# Patient Record
Sex: FEMALE | Employment: OTHER | URBAN - METROPOLITAN AREA
[De-identification: names, ages, dates, MRNs, and addresses within clinical notes are randomized per-mention and may not be internally consistent; named-entity substitution may affect disease eponyms.]

---

## 2018-01-10 ENCOUNTER — CARE COORDINATION (OUTPATIENT)
Dept: GASTROENTEROLOGY | Facility: CLINIC | Age: 63
End: 2018-01-10

## 2018-01-11 NOTE — PROGRESS NOTES
Pt states she is doing okay and can wait until March to be seen.  Move dup to 140 pm on March 5. Has my number to call if needs help with refill on asacol.

## 2018-02-14 NOTE — TELEPHONE ENCOUNTER
Phone Call:    Who did you talk to? (or) Who did you call? Pt returned my call   Call Detail/Action: Pt states all recs at [facility]

## 2018-02-28 ENCOUNTER — TELEPHONE (OUTPATIENT)
Dept: GASTROENTEROLOGY | Facility: CLINIC | Age: 63
End: 2018-02-28

## 2018-03-05 ENCOUNTER — OFFICE VISIT (OUTPATIENT)
Dept: GASTROENTEROLOGY | Facility: CLINIC | Age: 63
End: 2018-03-05
Payer: COMMERCIAL

## 2018-03-05 ENCOUNTER — PRE VISIT (OUTPATIENT)
Dept: GASTROENTEROLOGY | Facility: CLINIC | Age: 63
End: 2018-03-05

## 2018-03-05 VITALS
DIASTOLIC BLOOD PRESSURE: 68 MMHG | HEIGHT: 64 IN | OXYGEN SATURATION: 97 % | BODY MASS INDEX: 22.24 KG/M2 | WEIGHT: 130.3 LBS | TEMPERATURE: 98.4 F | HEART RATE: 71 BPM | SYSTOLIC BLOOD PRESSURE: 115 MMHG

## 2018-03-05 DIAGNOSIS — K52.832 LYMPHOCYTIC COLITIS: Primary | ICD-10-CM

## 2018-03-05 RX ORDER — HYDROCORTISONE 2.5 %
CREAM (GRAM) TOPICAL
COMMUNITY
Start: 2017-06-30

## 2018-03-05 RX ORDER — METOPROLOL SUCCINATE 25 MG/1
25 TABLET, EXTENDED RELEASE ORAL
COMMUNITY
Start: 2017-10-17 | End: 2018-06-13

## 2018-03-05 RX ORDER — ASPIRIN 81 MG/1
81 TABLET, CHEWABLE ORAL
COMMUNITY

## 2018-03-05 RX ORDER — CIPROFLOXACIN AND DEXAMETHASONE 3; 1 MG/ML; MG/ML
4 SUSPENSION/ DROPS AURICULAR (OTIC)
COMMUNITY
Start: 2018-03-05 | End: 2018-03-15

## 2018-03-05 RX ORDER — LOPERAMIDE HCL 2 MG
2 CAPSULE ORAL 4 TIMES DAILY PRN
COMMUNITY

## 2018-03-05 RX ORDER — DICYCLOMINE HYDROCHLORIDE 10 MG/1
CAPSULE ORAL
Qty: 240 CAPSULE | Refills: 3 | Status: SHIPPED | OUTPATIENT
Start: 2018-03-05

## 2018-03-05 RX ORDER — IBUPROFEN 200 MG
600 TABLET ORAL
COMMUNITY

## 2018-03-05 RX ORDER — VALACYCLOVIR HYDROCHLORIDE 500 MG/1
TABLET, FILM COATED ORAL
COMMUNITY
Start: 2017-11-20

## 2018-03-05 RX ORDER — MULTIVITAMIN
TABLET ORAL
COMMUNITY
Start: 2005-01-04

## 2018-03-05 ASSESSMENT — PAIN SCALES - GENERAL: PAINLEVEL: NO PAIN (0)

## 2018-03-05 NOTE — LETTER
3/5/2018       RE: Augustina Milligan  [address] [id number]     Dear Colleague,    Thank you for referring your patient, Augustina Milligan, to the [facility] GASTRO[facility]OLOGY AND IBD CLINIC at [facility]. Please see a copy of my visit note below.    IBD CLINIC VISIT     CC/REFERRING MD:  Referred Self  REASON FOR CONSULTATION: Ulcerative colitis    IBD HISTORY  Age at diagnosis: 2002  Extent of disease:   Current UC medications: None  Prior UC surgeries:  Prior IBD Medications:  Prednisone  Asacol    DRUG MONITORING  TPMT enzyme activity:     6-TGN/6-MMPN levels:    Biologic concentration:    DISEASE ASSESSMENT  Labs:  No lab results found.    Invalid input(s):  ALB,  HGB  Endoscopic assessment: Normal -   Enterography: --  Fecal calprotectin: --  C diff: --  IBD-7 serology: not consistent with IBD (2008)    ASSESSMENT/PLAN  62-year-old female with a history of ulcerative colitis who is here today to establish care with me    1.  Ulcerative colitis: Do not see any evidence that she definitively has chronic inflammation in her colon.  It sounds like there was one colonoscopy that showed colitis in the past.  However it was not clear that this was chronic colitis.  She was placed on prednisone and Asacol for this.  On review of her last 2 colonoscopies she had no endoscopic evidence of disease.  I can review the pathology from November 2017 which did not demonstrate any diagnostic abnormalities from the right with the left colon.  Options here that she has either been down her disease and is going for spontaneous deep remission or she has never had ulcerative colitis was misdiagnosed with infectious colitis.  Otherwise she is currently not on any medications and doing well with many symptoms of irritable bowel syndrome.  If she were to have new symptoms in the future it is reasonable to restart her Asacol.  However prior to any prednisone we would need to obtain a  flexible sigmoidoscopy or full colonoscopy to determine if she actually has any intestinal inflammation.  -- No specific IBD therapy at this time  -- Try to obtain records from initial diagnosis to determine if she has ulcerative colitis   -- Yearly CBC and LFTs for presumptive diagnosis of UC    2) Irritable bowel syndrome: Current symptoms are currently strongly suggestive of irritable bowel syndrome.  She has a mixed pattern with bursts of rapid transit and he had some hard stools suggesting slow transit.  I think fiber is essential for her stool pattern to normalize.  Unfortunately she has had bad reactions to Metamucil the past.  I counseled her to start a very low-dose of a soluble fiber such as Benefiber and slowly titrate up to 1 A day.  It was spasmodic such as Bentyl may also be helpful particularly given her pain is at night which I believe to be colonic spasms.  -- Soluble fiber: slow titration up to 1 cap per day  -- Bentyl at night  -- Follow-up in GI office in 4 months to assess response to fiber.     IBD Health Care Maintenance:    Vaccinations:  All patients on biologics should avoid live vaccines.    -- Influenza (every year)  -- TdaP (every 10 years)  -- Pneumococcal Pneumonia     One time confirmation of immunity or serologies:  -- Hepatitis A (serologies or immunizations)  -- Hepatitis B (serologies or immunizations)  -- Varicella  -- MMR  -- HPV (all aged 18-26)  -- Meningococcal meningitis (all patients at risk for meningitis)    Bone mineral density screening   -- Recommend all patients supplement with calcium and vitamin D  -- Known osteopenia - patient to follow with PCP.     Cancer Screening:  Colon cancer screening:  Unclear if she has UC or not.  She had been on a 1-2 year interval.  Will tentatively plan on repeat colonoscopy in 2020 (3 years from last).  If that is normal, will strongly consider 5-10 year interval.      Cervical cancer screening: Per OBGYN    Skin cancer screening:  Annual visual exam of skin by dermatologist since patient is immunocompromised    Depression Screening:  -- Over the last month, have you felt down, depressed, or hopeless? --  -- Over the last month, have you felt little interest or pleasure doing things? --    Misc:  -- Avoid tobacco use  -- Avoid NSAIDs as there is potentially a 25% chance of causing an IBD flare    Return to clinic in 4 months    Thank you for this consultation.  It was a pleasure to participate in the care of this patient; please contact us with any further questions.     Trevor Plaza MD   of Medicine  Inflammatory Bowel Disease Program   Division of Gastroenterology, Hepatology and Nutrition  [facility]          HPI:   She reports initially being diagnosed with ulcerative colitis in 2002.  She was treated with prednisone and Asacol for many years.The diagnosis is somewhat uncertain.  In fact in 2008 there was a question of whether or not she had ulcerative colitis.  Dr. Dannielle Wild sent in IBD panel which was negative for biomarkers for IBD.  And on review of available records it sounds like she had an episode of acute colitis which in retrospect may have been infectious.  However she was continued to be called ulcerative colitis and continue to take intermittent Asacol for episodes of diarrhea.  She has also been diagnosed with irritable bowel syndrome and notes that a lot of her GI symptoms are related to her diet    Currently she is having episodes of watery stools to hard stools alternating often the same day.  She will typically up to 3 stools a day.  They are not liquid but then can transition in a hard pebble-like stools she does think recently they have been somewhat more formed.  Stools are worse when she travels and worsens antibiotics.  Of note she has tried fiber in the past particular Metamucil and noted that it caused pain and abdominal cramping.      ROS:    No fevers or chills  No weight loss  No  blurry vision, double vision or change in vision  No sore throat  No lymphadenopathy  No headache, paraesthesias, or weakness in a limb  No shortness of breath or wheezing  No chest pain or pressure  No arthralgias or myalgias  No rashes or skin changes  No odynophagia or dysphagia  No BRBPR, hematochezia, melena  No dysuria, frequency or urgency  No hot/cold intolerance or polyria  No anxiety or depression    Extra intestinal manifestations of IBD:  No uveitis/episcleritis  No aphthous ulcers   No arthritis   No erythema nodosum/pyoderma gangrenosum.     PERTINENT PAST MEDICAL HISTORY:  Past Medical History:   Diagnosis Date     Colitis      GERD (gastroesophageal reflux disease)      IBS (irritable bowel syndrome)      Osteopenia        PREVIOUS SURGERIES:  Past Surgical History:   Procedure Laterality Date     LASIK         PREVIOUS ENDOSCOPY:  11/2017:   Impression:            - Tortuous colon.  - Normal mucosa in the entire examined colon. Biopsied.  - The examination was otherwise normal on direct and retroflexion views.  Path:   A. Colon, right, biopsy -- No diagnostic abnormality   B. Colon, left, biopsy -- No diagnostic abnormality     6/2012:  Impression:  - Tortuous colon.  - The entire examined colon is normal. This was biopsied.  - Non-bleeding internal hemorrhoids.  - The examined portion of the ileum was normal.  - The colitis appears to be in endoscopic and   clinical remission, await bx. May consider tapering off asacol. No signs of structural abnormality to explain bout of constipation.    ALLERGIES:     Allergies   Allergen Reactions     Lorazepam Other (See Comments)     Does not work for pt.She does not want to be prescribed this medication.     Morphine Other (See Comments)     CAN'T SLEEP,HYPERACTIVE     Sulfa Drugs Swelling     Zolpidem Other (See Comments)     Does not work for pt. She does not want to be prescribed this medication.     Codeine Anxiety     Diphenhydramine Anxiety      hyperactivity       PERTINENT MEDICATIONS:    Current Outpatient Prescriptions:      aspirin 81 MG chewable tablet, Take 81 mg by mouth, Disp: , Rfl:      ciprofloxacin-dexamethasone (CIPRODEX) otic suspension, 4 drops, Disp: , Rfl:      diclofenac (VOLTAREN) 1 % GEL topical gel, 2 g, Disp: , Rfl:      estradiol (ESTRING) 2 MG vaginal ring, Replace vaginally every 90 days, Disp: , Rfl:      hydrocortisone 2.5 % cream, apply to affected area 1-2 times daily prn itch, Disp: , Rfl:      ibuprofen (ADVIL/MOTRIN) 200 MG tablet, Take 600 mg by mouth, Disp: , Rfl:      metoprolol succinate (TOPROL-XL) 25 MG 24 hr tablet, Take 25 mg by mouth, Disp: , Rfl:      Multiple vitamin TABS, , Disp: , Rfl:      valACYclovir (VALTREX) 500 MG tablet, TAKE 1 TABLET BY MOUTH TWICE DAILY FOR 3 DAYS AS NEEDED FOR COLD SORES, Disp: , Rfl:      bismuth subsalicylate (PEPTO-BISMOL MAX STRENGTH) 525 MG/15ML SUSP, Take 30 mLs by mouth, Disp: , Rfl:      Bismuth Subsalicylate (PEPTO-BISMOL MAX STRENGTH PO), Take 2 chew tab by mouth, Disp: , Rfl:      loperamide (IMODIUM) 2 MG capsule, Take 2 mg by mouth 4 times daily as needed for diarrhea, Disp: , Rfl:     SOCIAL HISTORY:  Social History     Social History     Marital status:      Spouse name: N/A     Number of children: N/A     Years of education: N/A     Occupational History     Not on file.     Social History Main Topics     Smoking status: Never Smoker     Smokeless tobacco: Never Used     Alcohol use 3.0 oz/week     5 Glasses of wine per week     Drug use: No     Sexual activity: Not on file     Other Topics Concern     Not on file     Social History Narrative     No narrative on file       FAMILY HISTORY:  Family History   Problem Relation Age of Onset     Melanoma Brother      DIABETES Father      Hyperlipidemia Father      Breast Cancer Sister        No family history of IBD or colorectal cancer    Past/family/social history reviewed and no changes    PHYSICAL  "EXAMINATION:  Constitutional: aaox3, cooperative, pleasant, not dyspneic/diaphoretic, no acute distress  Vitals reviewed: /68 (BP Location: Left arm, Patient Position: Sitting, Cuff Size: Adult Regular)  Pulse 71  Temp 98.4  F (36.9  C) (Oral)  Ht 5' 4\"  Wt 130 lb 4.8 oz  SpO2 97%  BMI 22.37 kg/m2  Wt:   Wt Readings from Last 2 Encounters:   03/05/18 130 lb 4.8 oz      Eyes: Sclera anicteric/injected  Ears/nose/mouth/throat: Normal oropharynx without ulcers or exudate, mucus membranes moist, hearing intact  Neck: supple, thyroid normal size  CV: No edema  Respiratory: Unlabored breathing  Lymph: No axillary, submandibular, supraclavicular or inguinal lymphadenopathy  Abd:  Nondistended, +bs, no hepatosplenomegaly, nontender, no peritoneal signs  Skin: warm, perfused, no jaundice  Psych: Normal affect  MSK: Normal gait      PERTINENT STUDIES:  Most recent CBC:  No lab results found.  Most recent hepatic panel:  No lab results found.    Invalid input(s): MESHA, ALP  Most recent creatinine:  No lab results found.    2003:   UGI SBFT:   FINDINGS: The esophagus, stomach, and duodenum are normal.The small bowel is also normal. In particular, the terminal ileum  is normal. Transit time of the colon is normal. No massesidentified.  CONCLUSION: Normal esophagus, stomach, duodenum, and small bowel,including the terminal ileum. No evidence of Crohn's disease.    CT Ab/pelv:   IMPRESSION:  1. Irregular wall thickening involving the cecum, a portion of the ascending colon and the transverse colon as  described above.  Differential diagnostic considerations include a colitis of an inflammatory origin such as Crohn's disease or  infectious origin.  The appendix is within normal limits.  2. Low attenuation changes surrounding the uterus and ovaries.Low attenuation prominence of the endometrial canal. If indicated this may be further evaluated with a pelvic ultrasound.        Again, thank you for allowing me to participate " in the care of your patient.      Sincerely,    Trevor Plaza MD

## 2018-03-05 NOTE — NURSING NOTE
"Chief Complaint   Patient presents with     Consult     new IBD UC patient       Vitals:    03/05/18 1347   BP: 115/68   BP Location: Left arm   Patient Position: Sitting   Cuff Size: Adult Regular   Pulse: 71   Temp: 98.4  F (36.9  C)   TempSrc: Oral   SpO2: 97%   Weight: 130 lb 4.8 oz   Height: 5' 4\"       Body mass index is 22.37 kg/(m^2).    Merari Levi LPN                          "

## 2018-03-05 NOTE — PROGRESS NOTES
IBD CLINIC VISIT     CC/REFERRING MD:  Referred Self  REASON FOR CONSULTATION: Ulcerative colitis    IBD HISTORY  Age at diagnosis: 2002  Extent of disease:   Current UC medications: None  Prior UC surgeries:  Prior IBD Medications:  Prednisone  Asacol    DRUG MONITORING  TPMT enzyme activity:     6-TGN/6-MMPN levels:    Biologic concentration:    DISEASE ASSESSMENT  Labs:  No lab results found.    Invalid input(s):  ALB,  HGB  Endoscopic assessment: Normal -   Enterography: --  Fecal calprotectin: --  C diff: --  IBD-7 serology: not consistent with IBD (2008)    ASSESSMENT/PLAN  62-year-old female with a history of ulcerative colitis who is here today to establish care with me    1.  Ulcerative colitis: Do not see any evidence that she definitively has chronic inflammation in her colon.  It sounds like there was one colonoscopy that showed colitis in the past.  However it was not clear that this was chronic colitis.  She was placed on prednisone and Asacol for this.  On review of her last 2 colonoscopies she had no endoscopic evidence of disease.  I can review the pathology from November 2017 which did not demonstrate any diagnostic abnormalities from the right with the left colon.  Options here that she has either been down her disease and is going for spontaneous deep remission or she has never had ulcerative colitis was misdiagnosed with infectious colitis.  Otherwise she is currently not on any medications and doing well with many symptoms of irritable bowel syndrome.  If she were to have new symptoms in the future it is reasonable to restart her Asacol.  However prior to any prednisone we would need to obtain a flexible sigmoidoscopy or full colonoscopy to determine if she actually has any intestinal inflammation.  -- No specific IBD therapy at this time  -- Try to obtain records from initial diagnosis to determine if she has ulcerative colitis   -- Yearly CBC and LFTs for presumptive diagnosis of UC    2)  Irritable bowel syndrome: Current symptoms are currently strongly suggestive of irritable bowel syndrome.  She has a mixed pattern with bursts of rapid transit and he had some hard stools suggesting slow transit.  I think fiber is essential for her stool pattern to normalize.  Unfortunately she has had bad reactions to Metamucil the past.  I counseled her to start a very low-dose of a soluble fiber such as Benefiber and slowly titrate up to 1 A day.  It was spasmodic such as Bentyl may also be helpful particularly given her pain is at night which I believe to be colonic spasms.  -- Soluble fiber: slow titration up to 1 cap per day  -- Bentyl at night  -- Follow-up in GI office in 4 months to assess response to fiber.     IBD Health Care Maintenance:    Vaccinations:  All patients on biologics should avoid live vaccines.    -- Influenza (every year)  -- TdaP (every 10 years)  -- Pneumococcal Pneumonia     One time confirmation of immunity or serologies:  -- Hepatitis A (serologies or immunizations)  -- Hepatitis B (serologies or immunizations)  -- Varicella  -- MMR  -- HPV (all aged 18-26)  -- Meningococcal meningitis (all patients at risk for meningitis)    Bone mineral density screening   -- Recommend all patients supplement with calcium and vitamin D  -- Known osteopenia - patient to follow with PCP.     Cancer Screening:  Colon cancer screening:  Unclear if she has UC or not.  She had been on a 1-2 year interval.  Will tentatively plan on repeat colonoscopy in 2020 (3 years from last).  If that is normal, will strongly consider 5-10 year interval.      Cervical cancer screening: Per OBGYN    Skin cancer screening: Annual visual exam of skin by dermatologist since patient is immunocompromised    Depression Screening:  -- Over the last month, have you felt down, depressed, or hopeless? --  -- Over the last month, have you felt little interest or pleasure doing things? --    Misc:  -- Avoid tobacco use  -- Avoid  NSAIDs as there is potentially a 25% chance of causing an IBD flare    Return to clinic in 4 months    Thank you for this consultation.  It was a pleasure to participate in the care of this patient; please contact us with any further questions.     Trevor Plaza MD   of Medicine  Inflammatory Bowel Disease Program   Division of Gastroenterology, Hepatology and Nutrition  [facility]          HPI:   She reports initially being diagnosed with ulcerative colitis in 2002.  She was treated with prednisone and Asacol for many years.The diagnosis is somewhat uncertain.  In fact in 2008 there was a question of whether or not she had ulcerative colitis.  Dr. Dannielle Wild sent in IBD panel which was negative for biomarkers for IBD.  And on review of available records it sounds like she had an episode of acute colitis which in retrospect may have been infectious.  However she was continued to be called ulcerative colitis and continue to take intermittent Asacol for episodes of diarrhea.  She has also been diagnosed with irritable bowel syndrome and notes that a lot of her GI symptoms are related to her diet    Currently she is having episodes of watery stools to hard stools alternating often the same day.  She will typically up to 3 stools a day.  They are not liquid but then can transition in a hard pebble-like stools she does think recently they have been somewhat more formed.  Stools are worse when she travels and worsens antibiotics.  Of note she has tried fiber in the past particular Metamucil and noted that it caused pain and abdominal cramping.      ROS:    No fevers or chills  No weight loss  No blurry vision, double vision or change in vision  No sore throat  No lymphadenopathy  No headache, paraesthesias, or weakness in a limb  No shortness of breath or wheezing  No chest pain or pressure  No arthralgias or myalgias  No rashes or skin changes  No odynophagia or dysphagia  No BRBPR,  hematochezia, melena  No dysuria, frequency or urgency  No hot/cold intolerance or polyria  No anxiety or depression    Extra intestinal manifestations of IBD:  No uveitis/episcleritis  No aphthous ulcers   No arthritis   No erythema nodosum/pyoderma gangrenosum.     PERTINENT PAST MEDICAL HISTORY:  Past Medical History:   Diagnosis Date     Colitis      GERD (gastroesophageal reflux disease)      IBS (irritable bowel syndrome)      Osteopenia        PREVIOUS SURGERIES:  Past Surgical History:   Procedure Laterality Date     LASIK         PREVIOUS ENDOSCOPY:  11/2017:   Impression:            - Tortuous colon.  - Normal mucosa in the entire examined colon. Biopsied.  - The examination was otherwise normal on direct and retroflexion views.  Path:   A. Colon, right, biopsy -- No diagnostic abnormality   B. Colon, left, biopsy -- No diagnostic abnormality     6/2012:  Impression:  - Tortuous colon.  - The entire examined colon is normal. This was biopsied.  - Non-bleeding internal hemorrhoids.  - The examined portion of the ileum was normal.  - The colitis appears to be in endoscopic and   clinical remission, await bx. May consider tapering off asacol. No signs of structural abnormality to explain bout of constipation.    ALLERGIES:     Allergies   Allergen Reactions     Lorazepam Other (See Comments)     Does not work for pt.She does not want to be prescribed this medication.     Morphine Other (See Comments)     CAN'T SLEEP,HYPERACTIVE     Sulfa Drugs Swelling     Zolpidem Other (See Comments)     Does not work for pt. She does not want to be prescribed this medication.     Codeine Anxiety     Diphenhydramine Anxiety     hyperactivity       PERTINENT MEDICATIONS:    Current Outpatient Prescriptions:      aspirin 81 MG chewable tablet, Take 81 mg by mouth, Disp: , Rfl:      ciprofloxacin-dexamethasone (CIPRODEX) otic suspension, 4 drops, Disp: , Rfl:      diclofenac (VOLTAREN) 1 % GEL topical gel, 2 g, Disp: , Rfl:  "     estradiol (ESTRING) 2 MG vaginal ring, Replace vaginally every 90 days, Disp: , Rfl:      hydrocortisone 2.5 % cream, apply to affected area 1-2 times daily prn itch, Disp: , Rfl:      ibuprofen (ADVIL/MOTRIN) 200 MG tablet, Take 600 mg by mouth, Disp: , Rfl:      metoprolol succinate (TOPROL-XL) 25 MG 24 hr tablet, Take 25 mg by mouth, Disp: , Rfl:      Multiple vitamin TABS, , Disp: , Rfl:      valACYclovir (VALTREX) 500 MG tablet, TAKE 1 TABLET BY MOUTH TWICE DAILY FOR 3 DAYS AS NEEDED FOR COLD SORES, Disp: , Rfl:      bismuth subsalicylate (PEPTO-BISMOL MAX STRENGTH) 525 MG/15ML SUSP, Take 30 mLs by mouth, Disp: , Rfl:      Bismuth Subsalicylate (PEPTO-BISMOL MAX STRENGTH PO), Take 2 chew tab by mouth, Disp: , Rfl:      loperamide (IMODIUM) 2 MG capsule, Take 2 mg by mouth 4 times daily as needed for diarrhea, Disp: , Rfl:     SOCIAL HISTORY:  Social History     Social History     Marital status:      Spouse name: N/A     Number of children: N/A     Years of education: N/A     Occupational History     Not on file.     Social History Main Topics     Smoking status: Never Smoker     Smokeless tobacco: Never Used     Alcohol use 3.0 oz/week     5 Glasses of wine per week     Drug use: No     Sexual activity: Not on file     Other Topics Concern     Not on file     Social History Narrative     No narrative on file       FAMILY HISTORY:  Family History   Problem Relation Age of Onset     Melanoma Brother      DIABETES Father      Hyperlipidemia Father      Breast Cancer Sister        No family history of IBD or colorectal cancer    Past/family/social history reviewed and no changes    PHYSICAL EXAMINATION:  Constitutional: aaox3, cooperative, pleasant, not dyspneic/diaphoretic, no acute distress  Vitals reviewed: /68 (BP Location: Left arm, Patient Position: Sitting, Cuff Size: Adult Regular)  Pulse 71  Temp 98.4  F (36.9  C) (Oral)  Ht 5' 4\"  Wt 130 lb 4.8 oz  SpO2 97%  BMI 22.37 kg/m2  Wt: "   Wt Readings from Last 2 Encounters:   03/05/18 130 lb 4.8 oz      Eyes: Sclera anicteric/injected  Ears/nose/mouth/throat: Normal oropharynx without ulcers or exudate, mucus membranes moist, hearing intact  Neck: supple, thyroid normal size  CV: No edema  Respiratory: Unlabored breathing  Lymph: No axillary, submandibular, supraclavicular or inguinal lymphadenopathy  Abd:  Nondistended, +bs, no hepatosplenomegaly, nontender, no peritoneal signs  Skin: warm, perfused, no jaundice  Psych: Normal affect  MSK: Normal gait      PERTINENT STUDIES:  Most recent CBC:  No lab results found.  Most recent hepatic panel:  No lab results found.    Invalid input(s): MESHA, ALP  Most recent creatinine:  No lab results found.    2003:   UGI SBFT:   FINDINGS: The esophagus, stomach, and duodenum are normal.The small bowel is also normal. In particular, the terminal ileum  is normal. Transit time of the colon is normal. No massesidentified.  CONCLUSION: Normal esophagus, stomach, duodenum, and small bowel,including the terminal ileum. No evidence of Crohn's disease.    CT Ab/pelv:   IMPRESSION:  1. Irregular wall thickening involving the cecum, a portion of the ascending colon and the transverse colon as  described above.  Differential diagnostic considerations include a colitis of an inflammatory origin such as Crohn's disease or  infectious origin.  The appendix is within normal limits.  2. Low attenuation changes surrounding the uterus and ovaries.Low attenuation prominence of the endometrial canal. If indicated this may be further evaluated with a pelvic ultrasound.

## 2018-03-05 NOTE — PATIENT INSTRUCTIONS
It was a pleasure taking care of you today.  I've included a brief summary of our discussion and care plan from today's visit below.  Please review this information with your primary care provider.  _______________________________________________________________________    My recommendations are summarized as follows:    --  Trial of bentyl 10mg at night.  Can increase to max of four times a day if it helps    --  OK to use imodium as needed    --  Trial of benefiber 1/2 cap every other day. After 1-2 weeks, if you can tolerate it, increase to 1/2 cap a day. Then again if you can tolerate it after 1-2 weeks, increase to 1 cap per day.     Return to GI Clinic in 4 months to review your progress.          Thanks Gabriela Angeles RN Care Coordinator for Dr. Plaza  Phone   [phone]      _______________________________________________________________________    Who do I call with any questions after my visit?  Please be in touch if there are any further questions that arise following today's visit.  There are multiple ways to contact your gastroenterology care team.        During business hours, you may reach a Gastroenterology nurse at [phone].      To schedule or reschedule an appointment, please call [phone].       You can always send a secure message through [employer].  [employer] messages are answered by your nurse or doctor typically within 24 hours.  Please allow extra time on weekends and holidays.        For urgent/emergent questions after business hours, you may reach the on-call GI Fellow by contacting the [facility] at [phone].     How will I get the results of any tests ordered?    You will receive all of your results.  If you have signed up for [employer], any tests ordered at your visit will be available to you after your physician reviews them.  Typically this takes 1-2 weeks.  If there are urgent results that require a change in your care plan, your physician or nurse will call  you to discuss the next steps.      What is TOPSEChart?  [employer] is a secure way for you to access all of your healthcare records from the [facility].  It is a web based computer program, so you can sign on to it from any location.  It also allows you to send secure messages to your care team.  I recommend signing up for [employer] access if you have not already done so and are comfortable with using a computer.      How to I schedule a follow-up visit?  If you did not schedule a follow-up visit today, please call [phone] to schedule a follow-up office visit.        Sincerely,    Trevor Plaza MD    [facility]  Division of Gastroenterology

## 2018-03-05 NOTE — MR AVS SNAPSHOT
After Visit Summary   3/5/2018    Augustina Milligan    MRN: [id number]           Patient Information     Date Of Birth          1955        Visit Information        Provider Department      3/5/2018 1:40 PM Trevor Plaza MD [facility]        Today's Diagnoses     Lymphocytic colitis    -  1      Care Instructions    It was a pleasure taking care of you today.  I've included a brief summary of our discussion and care plan from today's visit below.  Please review this information with your primary care provider.  _______________________________________________________________________    My recommendations are summarized as follows:    --  Trial of bentyl 10mg at night.  Can increase to max of four times a day if it helps    --  OK to use imodium as needed    --  Trial of benefiber 1/2 cap every other day. After 1-2 weeks, if you can tolerate it, increase to 1/2 cap a day. Then again if you can tolerate it after 1-2 weeks, increase to 1 cap per day.     Return to GI Clinic in 4 months to review your progress.          Thanks Gabriela Angeles RN Care Coordinator for Dr. Plaza  Phone   [phone]      _______________________________________________________________________    Who do I call with any questions after my visit?  Please be in touch if there are any further questions that arise following today's visit.  There are multiple ways to contact your gastroenterology care team.        During business hours, you may reach a Gastroenterology nurse at [phone].      To schedule or reschedule an appointment, please call [phone].       You can always send a secure message through [employer].  [employer] messages are answered by your nurse or doctor typically within 24 hours.  Please allow extra time on weekends and holidays.        For urgent/emergent questions after business hours, you may reach the on-call GI Fellow by contacting the [facility]  at  [phone].     How will I get the results of any tests ordered?    You will receive all of your results.  If you have signed up for [employer], any tests ordered at your visit will be available to you after your physician reviews them.  Typically this takes 1-2 weeks.  If there are urgent results that require a change in your care plan, your physician or nurse will call you to discuss the next steps.      What is [employer]?  [employer] is a secure way for you to access all of your healthcare records from the [facility].  It is a web based computer program, so you can sign on to it from any location.  It also allows you to send secure messages to your care team.  I recommend signing up for [employer] access if you have not already done so and are comfortable with using a computer.      How to I schedule a follow-up visit?  If you did not schedule a follow-up visit today, please call [phone] to schedule a follow-up office visit.        Sincerely,    Trevor Plaza MD    [facility]  Division of Gastroenterology                Follow-ups after your visit        Your next 10 appointments already scheduled     Jun 13, 2018 11:40 AM CDT   (Arrive by 11:25 AM)   RETURN INFLAMMATORY BOWEL DISEASE with Miguel Mejia PA-C   [facility] ([facility])    [address] [place] [id number] [phone]              Who to contact     Please call your clinic at [phone] to:    Ask questions about your health    Make or cancel appointments    Discuss your medicines    Learn about your test results    Speak to your doctor            Additional Information About Your Visit        MyChart Information     [employer] gives you secure access to your electronic health record. If you see a primary care provider, you can also send messages to your care team and make appointments. If you have questions, please call  "your primary care clinic.  If you do not have a primary care provider, please call [phone] and they will assist you.      [employer] is an electronic gateway that provides easy, online access to your medical records. With [employer], you can request a clinic appointment, read your test results, renew a prescription or communicate with your care team.     To access your existing account, please contact your [facility] or call [phone] for assistance.        Care EveryWhere ID     This is your Care EveryWhere ID. This could be used by other organizations to access your [place] medical records  [id number]        Your Vitals Were     Pulse Temperature Height Pulse Oximetry BMI (Body Mass Index)       71 98.4  F (36.9  C) (Oral) 5' 4\" 97% 22.37 kg/m2        Blood Pressure from Last 3 Encounters:   03/05/18 115/68    Weight from Last 3 Encounters:   03/05/18 130 lb 4.8 oz              Today, you had the following     No orders found for display         Today's Medication Changes          These changes are accurate as of 3/5/18  2:35 PM.  If you have any questions, ask your nurse or doctor.               Start taking these medicines.        Dose/Directions    dicyclomine 10 MG capsule   Commonly known as:  BENTYL   Used for:  Lymphocytic colitis   Started by:  Trevor Plaza MD        Start at bedtime and make increase to  3  times a days with meals and bedtime.   Quantity:  240 capsule   Refills:  3            Where to get your medicines      These medications were sent to [id number] - [place], [place] - [address], [place] [address]     Phone:  [phone]     dicyclomine 10 MG capsule                Primary Care Provider Office Phone # Fax #    Katy Spears [phone]       [address] [address]        Equal Access to Services     BLUE WILEY AH: Hadii " quynh Gonzalez, wahellenda raymonbaldemar, qaybta kaliseth quiroz, waxshy idiin hayisaiahjamal levinjosuésteve gonzalez danii. So [facility] [phone].    ATENCIÓN: Si habla mary, tiene a oliveira disposición servicios gratuitos de asistencia lingüística. Mariam al [phone].    We comply with applicable federal civil rights laws and [place] laws. We do not discriminate on the basis of race, color, national origin, age, disability, sex, sexual orientation, or gender identity.            Thank you!     Thank you for choosing [facility]  for your care. Our goal is always to provide you with excellent care. Hearing back from our patients is one way we can continue to improve our services. Please take a few minutes to complete the written survey that you may receive in the mail after your visit with us. Thank you!             Your Updated Medication List - Protect others around you: Learn how to safely use, store and throw away your medicines at www.disposemymeds.org.          This list is accurate as of 3/5/18  2:35 PM.  Always use your most recent med list.                   Brand Name Dispense Instructions for use Diagnosis    aspirin 81 MG chewable tablet      Take 81 mg by mouth        ciprofloxacin-dexamethasone otic suspension    CIPRODEX     4 drops        diclofenac 1 % Gel topical gel    VOLTAREN     2 g        dicyclomine 10 MG capsule    BENTYL    240 capsule    Start at bedtime and make increase to  3  times a days with meals and bedtime.    Lymphocytic colitis       estradiol 2 MG vaginal ring    ESTRING     Replace vaginally every 90 days        hydrocortisone 2.5 % cream      apply to affected area 1-2 times daily prn itch        ibuprofen 200 MG tablet    ADVIL/MOTRIN     Take 600 mg by mouth        loperamide 2 MG capsule    IMODIUM     Take 2 mg by mouth 4 times daily as needed for diarrhea        metoprolol succinate 25 MG 24 hr tablet    TOPROL-XL     Take 25 mg by mouth        Multiple  vitamin Tabs           * PEPTO-BISMOL MAX STRENGTH 525 MG/15ML Susp   Generic drug:  bismuth subsalicylate      Take 30 mLs by mouth        * PEPTO-BISMOL MAX STRENGTH PO      Take 2 chew tab by mouth        valACYclovir 500 MG tablet    VALTREX     TAKE 1 TABLET BY MOUTH TWICE DAILY FOR 3 DAYS AS NEEDED FOR COLD SORES        * Notice:  This list has 2 medication(s) that are the same as other medications prescribed for you. Read the directions carefully, and ask your doctor or other care provider to review them with you.

## 2018-03-06 ENCOUNTER — CARE COORDINATION (OUTPATIENT)
Dept: GASTROENTEROLOGY | Facility: CLINIC | Age: 63
End: 2018-03-06

## 2018-03-06 NOTE — PROGRESS NOTES
Called pt to let her know we would like her to sign a release of information for [facility] prior to epic for colonoscopy report.

## 2018-03-11 ENCOUNTER — HEALTH MAINTENANCE LETTER (OUTPATIENT)
Age: 63
End: 2018-03-11

## 2018-03-23 ENCOUNTER — TRANSFERRED RECORDS (OUTPATIENT)
Dept: HEALTH INFORMATION MANAGEMENT | Facility: CLINIC | Age: 63
End: 2018-03-23

## 2018-03-27 ENCOUNTER — DOCUMENTATION ONLY (OUTPATIENT)
Dept: GASTROENTEROLOGY | Facility: CLINIC | Age: 63
End: 2018-03-27

## 2018-03-27 NOTE — PROGRESS NOTES
Received  2012 colonoscopy report from [facility].  No pathology report or images received.  Will ask  to contact [employer] to obtain other information as requested on the release of information.

## 2018-06-13 ENCOUNTER — OFFICE VISIT (OUTPATIENT)
Dept: GASTROENTEROLOGY | Facility: CLINIC | Age: 63
End: 2018-06-13
Payer: COMMERCIAL

## 2018-06-13 VITALS
OXYGEN SATURATION: 98 % | HEIGHT: 64 IN | HEART RATE: 70 BPM | WEIGHT: 131 LBS | BODY MASS INDEX: 22.36 KG/M2 | SYSTOLIC BLOOD PRESSURE: 122 MMHG | TEMPERATURE: 97.5 F | DIASTOLIC BLOOD PRESSURE: 76 MMHG

## 2018-06-13 DIAGNOSIS — K58.1 IRRITABLE BOWEL SYNDROME WITH CONSTIPATION: Primary | ICD-10-CM

## 2018-06-13 ASSESSMENT — PAIN SCALES - GENERAL: PAINLEVEL: NO PAIN (0)

## 2018-06-13 NOTE — LETTER
6/13/2018     RE: Augustina Milligan  [address] [address]     Dear Colleague,    Thank you for referring your patient, Augustina Milligan, to the [facility] at [facility]. Please see a copy of my visit note below.    IBD CLINIC VISIT     CC/REFERRING MD:  Referred Self  REASON FOR CONSULTATION: Ulcerative colitis    IBD HISTORY  Age at diagnosis: 2001  Extent of disease:   Current UC medications: None  Prior UC surgeries: None  Prior IBD Medications:   Prednisone  Asacol    DRUG MONITORING  TPMT enzyme activity: --    6-TGN/6-MMPN levels: --     Biologic concentration: --    DISEASE ASSESSMENT  Labs:  ESR 11  IBD-7 serology: not consistent with IBD (2008)  Endoscopic assessment: Normal    Enterography: has not been perfomed  Fecal calprotectin: has not been performed  C diff: --      ASSESSMENT/PLAN  62-year-old female with a reported history of ulcerative colitis who is here today for follow-up:    1.  Ulcerative colitis: Upon review of patient's previous endoscopic assessments, there has been a lack of a chronic component of her colitis.  Most recent endoscopic assessment showed no evidence of disease.  She may be in deep remission with no evidence of any component of disease versus an incorrect diagnosis and originally had presented with acute colitis that may have been infectious in origin.  Patient's symptoms currently are largely consistent with IBS and not inflammatory.  If she were to have concerning symptoms of active disease, we could consider a restart of 5 ASA therapy.  Higher to any initiation of aerated therapy we would need to obtain a flexible sigmoidoscopy or full colonoscopy to determine if there was any intestinal inflammation.  --No specific IBD therapy indicated at this time  --Would require records from initial diagnosis to determine if ulcerative colitis is not accurate diagnosis  --Yearly CBC and LFTs for  presumptive diagnosis of UC    2.  Irritable bowel syndrome: Patient continues to fluctuate from loose stool to passing hard larisa.  She had a brief trial of fiber however did not complete a sufficient course due to significant bloating and abdominal cramping.  We discussed starting MiraLAX to optimize her bowel pattern and eliminate the pebble-like stools then start a soluble fiber supplementation and slowly titrate (increase/decrease) based on stool consistency and frequency.  --Initiation of MiraLAX and soluble fiber supplementation  --May continue Bentyl for abdominal spasms and cramps    3. IBD Health Care Maintenance:    Vaccinations:  All patients on biologics should avoid live vaccines.    -- Influenza (every year)  -- TdaP (every 10 years)  -- Pneumococcal Pneumonia     One time confirmation of immunity or serologies:  -- Hepatitis A (serologies or immunizations)  -- Hepatitis B (serologies or immunizations)  -- Varicella  -- MMR  -- HPV (all aged 18-26)  -- Meningococcal meningitis (all patients at risk for meningitis)    Bone mineral density screening   -- Recommend all patients supplement with calcium and vitamin D  -- Known osteopenia - patient to follow with PCP.     Cancer Screening:  Colon cancer screening:  Unclear if she has UC or not.  She had been on a 1-2 year interval.  Will tentatively plan on repeat colonoscopy in 2020 (3 years from last).  If that is normal, will strongly consider 5-10 year interval.      Cervical cancer screening: Per OBGYN    Skin cancer screening: per PCP    Depression Screening:  -- Over the last month, have you felt down, depressed, or hopeless? No  -- Over the last month, have you felt little interest or pleasure doing things? No    Misc:  -- Avoid tobacco use  -- Avoid NSAIDs as there is potentially a 25% chance of causing an IBD flare    Return to clinic in 1 year    Thank you for this consultation.  It was a pleasure to participate in the care of this patient;  please contact us with any further questions.       Miguel Mejia PA-C  Division of Gastroenterology, Hepatology and Nutrition  [facility]      HPI:   63-year-old female that was reportedly diagnosed with ulcerative colitis in 2002.  At that point she was treated with prednisone and Asacol for years.  Her diagnosis of IBD has been uncertain.  She has had serologic markers that were negative for IBD.  It appears that she had an episode of acute colitis which in retrospect may have been infectious.  She continued to be classified as ulcerative colitis and continued to take intermittent Asacol for years for episodes of diarrhea.  Patient has also been diagnosed with irritable bowel syndrome and attributes many of her GI symptoms may be related to her diet.    Her current presentation is 2-3 stools on most days.  Consistency varies from loose stool to firm small larisa.  She denies any blood in stool.  She has urgency with no stools, primarily after eating.  Often she feels that she has not fully evacuated her stools.  She denies any nighttime stools.  Of note she recently had an injury to her back and required a two-week taper of steroids.  She is unsure if this changed her bowel pattern.  Last visit she was instructed to start fiber supplementation (half a dose of Benefiber daily) however this caused significant bloating and cramping and she discontinued this.  She takes Bentyl as needed when she eats foods that may cause abdominal cramping such as nuts or popcorn.    ROS:    No fevers or chills  No weight loss  No blurry vision, double vision or change in vision  No sore throat  No lymphadenopathy  No headache, paraesthesias, or weakness in a limb  No shortness of breath or wheezing  No chest pain or pressure  + arthralgias or myalgias (back and hip)  No rashes or skin changes  No odynophagia or dysphagia  No BRBPR, hematochezia, melena  No dysuria, frequency or urgency  No hot/cold intolerance or  polyria  No anxiety or depression    Extra intestinal manifestations of IBD:  No uveitis/episcleritis  No aphthous ulcers   No arthritis   No erythema nodosum/pyoderma gangrenosum.     PERTINENT PAST MEDICAL HISTORY:  Past Medical History:   Diagnosis Date     Colitis      GERD (gastroesophageal reflux disease)      IBS (irritable bowel syndrome)      Osteopenia        PREVIOUS SURGERIES:  Past Surgical History:   Procedure Laterality Date     LASIK         PREVIOUS ENDOSCOPY:  11/2017:   Impression:            - Tortuous colon.  - Normal mucosa in the entire examined colon. Biopsied.  - The examination was otherwise normal on direct and retroflexion views.  Path:   A. Colon, right, biopsy -- No diagnostic abnormality   B. Colon, left, biopsy -- No diagnostic abnormality     6/2012:  Impression:  - Tortuous colon.  - The entire examined colon is normal. This was biopsied.  - Non-bleeding internal hemorrhoids.  - The examined portion of the ileum was normal.  - The colitis appears to be in endoscopic and   clinical remission, await bx. May consider tapering off asacol. No signs of structural abnormality to explain bout of constipation.    ALLERGIES:     Allergies   Allergen Reactions     Lorazepam Other (See Comments)     Does not work for pt.She does not want to be prescribed this medication.     Morphine Other (See Comments)     CAN'T SLEEP,HYPERACTIVE     Sulfa Drugs Swelling     Zolpidem Other (See Comments)     Does not work for pt. She does not want to be prescribed this medication.     Codeine Anxiety     Diphenhydramine Anxiety     hyperactivity       PERTINENT MEDICATIONS:    Current Outpatient Prescriptions:      aspirin 81 MG chewable tablet, Take 81 mg by mouth, Disp: , Rfl:      Bismuth Subsalicylate (PEPTO-BISMOL MAX STRENGTH PO), Take 2 chew tab by mouth, Disp: , Rfl:      bismuth subsalicylate (PEPTO-BISMOL MAX STRENGTH) 525 MG/15ML SUSP, Take 30 mLs by mouth, Disp: , Rfl:      diclofenac (VOLTAREN) 1  "% GEL topical gel, 2 g, Disp: , Rfl:      dicyclomine (BENTYL) 10 MG capsule, Start at bedtime and make increase to  3  times a days with meals and bedtime., Disp: 240 capsule, Rfl: 3     estradiol (ESTRING) 2 MG vaginal ring, Replace vaginally every 90 days, Disp: , Rfl:      hydrocortisone 2.5 % cream, apply to affected area 1-2 times daily prn itch, Disp: , Rfl:      ibuprofen (ADVIL/MOTRIN) 200 MG tablet, Take 600 mg by mouth, Disp: , Rfl:      loperamide (IMODIUM) 2 MG capsule, Take 2 mg by mouth 4 times daily as needed for diarrhea, Disp: , Rfl:      metoprolol succinate (TOPROL-XL) 25 MG 24 hr tablet, Take 25 mg by mouth, Disp: , Rfl:      Multiple vitamin TABS, , Disp: , Rfl:      valACYclovir (VALTREX) 500 MG tablet, TAKE 1 TABLET BY MOUTH TWICE DAILY FOR 3 DAYS AS NEEDED FOR COLD SORES, Disp: , Rfl:     SOCIAL HISTORY:  Social History     Social History     Marital status:      Spouse name: N/A     Number of children: N/A     Years of education: N/A     Occupational History     Not on file.     Social History Main Topics     Smoking status: Never Smoker     Smokeless tobacco: Never Used     Alcohol use 3.0 oz/week     5 Glasses of wine per week     Drug use: No     Sexual activity: Not on file     Other Topics Concern     Not on file     Social History Narrative       FAMILY HISTORY:  Family History   Problem Relation Age of Onset     Melanoma Brother      DIABETES Father      Hyperlipidemia Father      Breast Cancer Sister        No family history of IBD or colorectal cancer    Past/family/social history reviewed and no changes    PHYSICAL EXAMINATION:  Constitutional: aaox3, cooperative, pleasant, not dyspneic/diaphoretic, no acute distress  Vitals reviewed: /76  Pulse 70  Temp 97.5  F (36.4  C) (Oral)  Ht 1.626 m (5' 4\")  Wt 59.4 kg (131 lb)  SpO2 98%  BMI 22.49 kg/m2  Wt:   Wt Readings from Last 2 Encounters:   06/13/18 59.4 kg (131 lb)   03/05/18 59.1 kg (130 lb 4.8 oz)      Eyes: " Sclera anicteric/injected  Ears/nose/mouth/throat: Normal oropharynx without ulcers or exudate, mucus membranes moist, hearing intact  Neck: supple, thyroid normal size  CV: No edema  Respiratory: Unlabored breathing  Lymph: No axillary, submandibular, supraclavicular or inguinal lymphadenopathy  Abd:  Nondistended, +bs, no hepatosplenomegaly, nontender, no peritoneal signs  Skin: warm, perfused, no jaundice  Psych: Normal affect  MSK: Normal gait      PERTINENT STUDIES:    2003:   UGI SBFT:   FINDINGS: The esophagus, stomach, and duodenum are normal.The small bowel is also normal. In particular, the terminal ileum  is normal. Transit time of the colon is normal. No massesidentified.  CONCLUSION: Normal esophagus, stomach, duodenum, and small bowel,including the terminal ileum. No evidence of Crohn's disease.    CT Ab/pelv:   IMPRESSION:  1. Irregular wall thickening involving the cecum, a portion of the ascending colon and the transverse colon as  described above.  Differential diagnostic considerations include a colitis of an inflammatory origin such as Crohn's disease or  infectious origin.  The appendix is within normal limits.  2. Low attenuation changes surrounding the uterus and ovaries.Low attenuation prominence of the endometrial canal. If indicated this may be further evaluated with a pelvic ultrasound.    Again, thank you for allowing me to participate in the care of your patient.      Sincerely,    Miguel Mejia PA-C

## 2018-06-13 NOTE — PATIENT INSTRUCTIONS
It was a pleasure taking care of you today.  I've included a brief summary of our discussion and care plan from today's visit below.  Please review this information with your primary care provider.  ______________________________________________________________________    My recommendations are summarized as follows:    -- Recommend a trial of Miralax.  This is not a stimulant laxative and is safe to take on a daily basis.  Try 1-2 cap(s) every day.  You can titrate this dose (increase or decrease) based on stool frequency and consistency.    -- After a week of miralax, recommend soluble fiber supplementation on a daily basis (Metamucil, citrucel or benefiber).  Start with 1 tablespoon per day and if tolerated, may increase up to 2-3 tablespoons per day.  You may experience some bloating with initiation of fiber supplementation that will improve over the first month.  A good fiber trial to evaluate the effect is 3-6 months.    -- Labs once a year (due January 2019)    -- Avoid ibuprofen    -- If you become symptomatic, please call our office    Return to GI Clinic in 1 year to review your progress.    ______________________________________________________________________    Who do I call with any questions after my visit?  Please be in touch if there are any further questions that arise following today's visit.  There are multiple ways to contact your gastroenterology care team.        During business hours, you may reach a Gastroenterology nurse at [phone], option 3.       To schedule or reschedule an appointment, please call [phone].       You can always send a secure message through [employer].  [employer] messages are answered by your nurse or doctor typically within 24 hours.  Please allow extra time on weekends and holidays.        For urgent/emergent questions after business hours, you may reach the on-call GI Fellow by contacting the [facility]  at [phone].     How will I get the  results of any tests ordered?    You will receive all of your results.  If you have signed up for [employer], any tests ordered at your visit will be available to you after your physician reviews them.  Typically this takes 1-2 weeks.  If there are urgent results that require a change in your care plan, your physician or nurse will call you to discuss the next steps.      What is [employer]?  [employer] is a secure way for you to access all of your healthcare records from the [facility].  It is a web based computer program, so you can sign on to it from any location.  It also allows you to send secure messages to your care team.  I recommend signing up for [employer] access if you have not already done so and are comfortable with using a computer.      How to I schedule a follow-up visit?  If you did not schedule a follow-up visit today, please call [phone] to schedule a follow-up office visit.        Sincerely,    Miguel Mejia PA-C  [facility]  Division of Gastroenterology

## 2018-06-13 NOTE — MR AVS SNAPSHOT
After Visit Summary   6/13/2018    Augustina Milligan    MRN: [id number]           Patient Information     Date Of Birth          1955        Visit Information        Provider Department      6/13/2018 11:40 AM Miguel Mejia PA-C [facility]        Care Instructions    It was a pleasure taking care of you today.  I've included a brief summary of our discussion and care plan from today's visit below.  Please review this information with your primary care provider.  ______________________________________________________________________    My recommendations are summarized as follows:    -- Recommend a trial of Miralax.  This is not a stimulant laxative and is safe to take on a daily basis.  Try 1-2 cap(s) every day.  You can titrate this dose (increase or decrease) based on stool frequency and consistency.    -- After a week of miralax, recommend soluble fiber supplementation on a daily basis (Metamucil, citrucel or benefiber).  Start with 1 tablespoon per day and if tolerated, may increase up to 2-3 tablespoons per day.  You may experience some bloating with initiation of fiber supplementation that will improve over the first month.  A good fiber trial to evaluate the effect is 3-6 months.    -- Labs once a year (due January 2019)    -- Avoid ibuprofen    -- If you become symptomatic, please call our office    Return to GI Clinic in 1 year to review your progress.    ______________________________________________________________________    Who do I call with any questions after my visit?  Please be in touch if there are any further questions that arise following today's visit.  There are multiple ways to contact your gastroenterology care team.        During business hours, you may reach a Gastroenterology nurse at [phone], option 3.       To schedule or reschedule an appointment, please call [phone].       You can always send a secure message through [employer].   [employer] messages are answered by your nurse or doctor typically within 24 hours.  Please allow extra time on weekends and holidays.        For urgent/emergent questions after business hours, you may reach the on-call GI Fellow by contacting the [facility]  at [phone].     How will I get the results of any tests ordered?    You will receive all of your results.  If you have signed up for [employer], any tests ordered at your visit will be available to you after your physician reviews them.  Typically this takes 1-2 weeks.  If there are urgent results that require a change in your care plan, your physician or nurse will call you to discuss the next steps.      What is [employer]?  [employer] is a secure way for you to access all of your healthcare records from the [facility].  It is a web based computer program, so you can sign on to it from any location.  It also allows you to send secure messages to your care team.  I recommend signing up for [employer] access if you have not already done so and are comfortable with using a computer.      How to I schedule a follow-up visit?  If you did not schedule a follow-up visit today, please call [phone] to schedule a follow-up office visit.        Sincerely,    Miguel Mejia PA-C  [facility]  Division of Gastroenterology                Follow-ups after your visit        Who to contact     Please call your clinic at [phone] to:    Ask questions about your health    Make or cancel appointments    Discuss your medicines    Learn about your test results    Speak to your doctor            Additional Information About Your Visit        MyChart Information     [employer] gives you secure access to your electronic health record. If you see a primary care provider, you can also send messages to your care team and make appointments. If you have questions, please call your primary care clinic.  If you do not have a primary care provider,  "please call [phone] and they will assist you.      indeni is an electronic gateway that provides easy, online access to your medical records. With indeni, you can request a clinic appointment, read your test results, renew a prescription or communicate with your care team.     To access your existing account, please contact your [facility] or call [phone] for assistance.        Care EveryWhere ID     This is your Care EveryWhere ID. This could be used by other organizations to access your [place] medical records  [id number]        Your Vitals Were     Pulse Temperature Height Pulse Oximetry BMI (Body Mass Index)       70 97.5  F (36.4  C) (Oral) 5' 4\" 98% 22.49 kg/m2        Blood Pressure from Last 3 Encounters:   06/13/18 122/76   03/05/18 115/68    Weight from Last 3 Encounters:   06/13/18 131 lb   03/05/18 130 lb 4.8 oz              Today, you had the following     No orders found for display       Primary Care Provider Office Phone # Fax #    Ktay Spears [phone]       [place] [address]        Equal Access to Services     BLUE WILEY AH: Hadii aad ku hadasho Soomaali, waaxda luqadaha, qaybta kaalmada adeegyada, waxay idiin hayaan adeeg khcuca laChidiaan ah. So [facility] [phone].    ATENCIÓN: Si habla español, tiene a oliveira disposición servicios gratuitos de asistencia lingüística. Llame al [phone].    We comply with applicable federal civil rights laws and [place] laws. We do not discriminate on the basis of race, color, national origin, age, disability, sex, sexual orientation, or gender identity.            Thank you!     Thank you for choosing [facility]  for your care. Our goal is always to provide you with excellent care. Hearing back from our patients is one way we can continue to improve our services. Please take a few minutes to complete the written survey that you may " receive in the mail after your visit with us. Thank you!             Your Updated Medication List - Protect others around you: Learn how to safely use, store and throw away your medicines at www.disposemymeds.org.          This list is accurate as of 6/13/18 12:17 PM.  Always use your most recent med list.                   Brand Name Dispense Instructions for use Diagnosis    aspirin 81 MG chewable tablet      Take 81 mg by mouth        diclofenac 1 % Gel topical gel    VOLTAREN     2 g        dicyclomine 10 MG capsule    BENTYL    240 capsule    Start at bedtime and make increase to  3  times a days with meals and bedtime.    Lymphocytic colitis       estradiol 2 MG vaginal ring    ESTRING     Replace vaginally every 90 days        hydrocortisone 2.5 % cream      apply to affected area 1-2 times daily prn itch        ibuprofen 200 MG tablet    ADVIL/MOTRIN     Take 600 mg by mouth        loperamide 2 MG capsule    IMODIUM     Take 2 mg by mouth 4 times daily as needed for diarrhea        Multiple vitamin Tabs           PEPTO-BISMOL MAX STRENGTH PO      Take 2 chew tab by mouth        valACYclovir 500 MG tablet    VALTREX     TAKE 1 TABLET BY MOUTH TWICE DAILY FOR 3 DAYS AS NEEDED FOR COLD SORES

## 2018-06-13 NOTE — PROGRESS NOTES
IBD CLINIC VISIT     CC/REFERRING MD:  Referred Self  REASON FOR CONSULTATION: Ulcerative colitis    IBD HISTORY  Age at diagnosis: 2001  Extent of disease:   Current UC medications: None  Prior UC surgeries: None  Prior IBD Medications:   Prednisone  Asacol    DRUG MONITORING  TPMT enzyme activity: --    6-TGN/6-MMPN levels: --     Biologic concentration: --    DISEASE ASSESSMENT  Labs:  ESR 11  IBD-7 serology: not consistent with IBD (2008)  Endoscopic assessment: Normal    Enterography: has not been perfomed  Fecal calprotectin: has not been performed  C diff: --      ASSESSMENT/PLAN  62-year-old female with a reported history of ulcerative colitis who is here today for follow-up:    1.  Ulcerative colitis: Upon review of patient's previous endoscopic assessments, there has been a lack of a chronic component of her colitis.  Most recent endoscopic assessment showed no evidence of disease.  She may be in deep remission with no evidence of any component of disease versus an incorrect diagnosis and originally had presented with acute colitis that may have been infectious in origin.  Patient's symptoms currently are largely consistent with IBS and not inflammatory.  If she were to have concerning symptoms of active disease, we could consider a restart of 5 ASA therapy.  Higher to any initiation of aerated therapy we would need to obtain a flexible sigmoidoscopy or full colonoscopy to determine if there was any intestinal inflammation.  --No specific IBD therapy indicated at this time  --Would require records from initial diagnosis to determine if ulcerative colitis is not accurate diagnosis  --Yearly CBC and LFTs for presumptive diagnosis of UC    2.  Irritable bowel syndrome: Patient continues to fluctuate from loose stool to passing hard larisa.  She had a brief trial of fiber however did not complete a sufficient course due to significant bloating and abdominal cramping.  We discussed starting MiraLAX to optimize  her bowel pattern and eliminate the pebble-like stools then start a soluble fiber supplementation and slowly titrate (increase/decrease) based on stool consistency and frequency.  --Initiation of MiraLAX and soluble fiber supplementation  --May continue Bentyl for abdominal spasms and cramps    3. IBD Health Care Maintenance:    Vaccinations:  All patients on biologics should avoid live vaccines.    -- Influenza (every year)  -- TdaP (every 10 years)  -- Pneumococcal Pneumonia     One time confirmation of immunity or serologies:  -- Hepatitis A (serologies or immunizations)  -- Hepatitis B (serologies or immunizations)  -- Varicella  -- MMR  -- HPV (all aged 18-26)  -- Meningococcal meningitis (all patients at risk for meningitis)    Bone mineral density screening   -- Recommend all patients supplement with calcium and vitamin D  -- Known osteopenia - patient to follow with PCP.     Cancer Screening:  Colon cancer screening:  Unclear if she has UC or not.  She had been on a 1-2 year interval.  Will tentatively plan on repeat colonoscopy in 2020 (3 years from last).  If that is normal, will strongly consider 5-10 year interval.      Cervical cancer screening: Per OBGYN    Skin cancer screening: per PCP    Depression Screening:  -- Over the last month, have you felt down, depressed, or hopeless? No  -- Over the last month, have you felt little interest or pleasure doing things? No    Misc:  -- Avoid tobacco use  -- Avoid NSAIDs as there is potentially a 25% chance of causing an IBD flare    Return to clinic in 1 year    Thank you for this consultation.  It was a pleasure to participate in the care of this patient; please contact us with any further questions.       Miguel Mejia PA-C  Division of Gastroenterology, Hepatology and Nutrition  [facility]      HPI:   63-year-old female that was reportedly diagnosed with ulcerative colitis in 2002.  At that point she was treated with prednisone and Asacol  for years.  Her diagnosis of IBD has been uncertain.  She has had serologic markers that were negative for IBD.  It appears that she had an episode of acute colitis which in retrospect may have been infectious.  She continued to be classified as ulcerative colitis and continued to take intermittent Asacol for years for episodes of diarrhea.  Patient has also been diagnosed with irritable bowel syndrome and attributes many of her GI symptoms may be related to her diet.    Her current presentation is 2-3 stools on most days.  Consistency varies from loose stool to firm small larisa.  She denies any blood in stool.  She has urgency with no stools, primarily after eating.  Often she feels that she has not fully evacuated her stools.  She denies any nighttime stools.  Of note she recently had an injury to her back and required a two-week taper of steroids.  She is unsure if this changed her bowel pattern.  Last visit she was instructed to start fiber supplementation (half a dose of Benefiber daily) however this caused significant bloating and cramping and she discontinued this.  She takes Bentyl as needed when she eats foods that may cause abdominal cramping such as nuts or popcorn.    ROS:    No fevers or chills  No weight loss  No blurry vision, double vision or change in vision  No sore throat  No lymphadenopathy  No headache, paraesthesias, or weakness in a limb  No shortness of breath or wheezing  No chest pain or pressure  + arthralgias or myalgias (back and hip)  No rashes or skin changes  No odynophagia or dysphagia  No BRBPR, hematochezia, melena  No dysuria, frequency or urgency  No hot/cold intolerance or polyria  No anxiety or depression    Extra intestinal manifestations of IBD:  No uveitis/episcleritis  No aphthous ulcers   No arthritis   No erythema nodosum/pyoderma gangrenosum.     PERTINENT PAST MEDICAL HISTORY:  Past Medical History:   Diagnosis Date     Colitis      GERD (gastroesophageal reflux  disease)      IBS (irritable bowel syndrome)      Osteopenia        PREVIOUS SURGERIES:  Past Surgical History:   Procedure Laterality Date     LASIK         PREVIOUS ENDOSCOPY:  11/2017:   Impression:            - Tortuous colon.  - Normal mucosa in the entire examined colon. Biopsied.  - The examination was otherwise normal on direct and retroflexion views.  Path:   A. Colon, right, biopsy -- No diagnostic abnormality   B. Colon, left, biopsy -- No diagnostic abnormality     6/2012:  Impression:  - Tortuous colon.  - The entire examined colon is normal. This was biopsied.  - Non-bleeding internal hemorrhoids.  - The examined portion of the ileum was normal.  - The colitis appears to be in endoscopic and   clinical remission, await bx. May consider tapering off asacol. No signs of structural abnormality to explain bout of constipation.    ALLERGIES:     Allergies   Allergen Reactions     Lorazepam Other (See Comments)     Does not work for pt.She does not want to be prescribed this medication.     Morphine Other (See Comments)     CAN'T SLEEP,HYPERACTIVE     Sulfa Drugs Swelling     Zolpidem Other (See Comments)     Does not work for pt. She does not want to be prescribed this medication.     Codeine Anxiety     Diphenhydramine Anxiety     hyperactivity       PERTINENT MEDICATIONS:    Current Outpatient Prescriptions:      aspirin 81 MG chewable tablet, Take 81 mg by mouth, Disp: , Rfl:      Bismuth Subsalicylate (PEPTO-BISMOL MAX STRENGTH PO), Take 2 chew tab by mouth, Disp: , Rfl:      bismuth subsalicylate (PEPTO-BISMOL MAX STRENGTH) 525 MG/15ML SUSP, Take 30 mLs by mouth, Disp: , Rfl:      diclofenac (VOLTAREN) 1 % GEL topical gel, 2 g, Disp: , Rfl:      dicyclomine (BENTYL) 10 MG capsule, Start at bedtime and make increase to  3  times a days with meals and bedtime., Disp: 240 capsule, Rfl: 3     estradiol (ESTRING) 2 MG vaginal ring, Replace vaginally every 90 days, Disp: , Rfl:      hydrocortisone 2.5 %  "cream, apply to affected area 1-2 times daily prn itch, Disp: , Rfl:      ibuprofen (ADVIL/MOTRIN) 200 MG tablet, Take 600 mg by mouth, Disp: , Rfl:      loperamide (IMODIUM) 2 MG capsule, Take 2 mg by mouth 4 times daily as needed for diarrhea, Disp: , Rfl:      metoprolol succinate (TOPROL-XL) 25 MG 24 hr tablet, Take 25 mg by mouth, Disp: , Rfl:      Multiple vitamin TABS, , Disp: , Rfl:      valACYclovir (VALTREX) 500 MG tablet, TAKE 1 TABLET BY MOUTH TWICE DAILY FOR 3 DAYS AS NEEDED FOR COLD SORES, Disp: , Rfl:     SOCIAL HISTORY:  Social History     Social History     Marital status:      Spouse name: N/A     Number of children: N/A     Years of education: N/A     Occupational History     Not on file.     Social History Main Topics     Smoking status: Never Smoker     Smokeless tobacco: Never Used     Alcohol use 3.0 oz/week     5 Glasses of wine per week     Drug use: No     Sexual activity: Not on file     Other Topics Concern     Not on file     Social History Narrative       FAMILY HISTORY:  Family History   Problem Relation Age of Onset     Melanoma Brother      DIABETES Father      Hyperlipidemia Father      Breast Cancer Sister        No family history of IBD or colorectal cancer    Past/family/social history reviewed and no changes    PHYSICAL EXAMINATION:  Constitutional: aaox3, cooperative, pleasant, not dyspneic/diaphoretic, no acute distress  Vitals reviewed: /76  Pulse 70  Temp 97.5  F (36.4  C) (Oral)  Ht 1.626 m (5' 4\")  Wt 59.4 kg (131 lb)  SpO2 98%  BMI 22.49 kg/m2  Wt:   Wt Readings from Last 2 Encounters:   06/13/18 59.4 kg (131 lb)   03/05/18 59.1 kg (130 lb 4.8 oz)      Eyes: Sclera anicteric/injected  Ears/nose/mouth/throat: Normal oropharynx without ulcers or exudate, mucus membranes moist, hearing intact  Neck: supple, thyroid normal size  CV: No edema  Respiratory: Unlabored breathing  Lymph: No axillary, submandibular, supraclavicular or inguinal " lymphadenopathy  Abd:  Nondistended, +bs, no hepatosplenomegaly, nontender, no peritoneal signs  Skin: warm, perfused, no jaundice  Psych: Normal affect  MSK: Normal gait      PERTINENT STUDIES:    2003:   UGI SBFT:   FINDINGS: The esophagus, stomach, and duodenum are normal.The small bowel is also normal. In particular, the terminal ileum  is normal. Transit time of the colon is normal. No massesidentified.  CONCLUSION: Normal esophagus, stomach, duodenum, and small bowel,including the terminal ileum. No evidence of Crohn's disease.    CT Ab/pelv:   IMPRESSION:  1. Irregular wall thickening involving the cecum, a portion of the ascending colon and the transverse colon as  described above.  Differential diagnostic considerations include a colitis of an inflammatory origin such as Crohn's disease or  infectious origin.  The appendix is within normal limits.  2. Low attenuation changes surrounding the uterus and ovaries.Low attenuation prominence of the endometrial canal. If indicated this may be further evaluated with a pelvic ultrasound.

## 2018-06-13 NOTE — NURSING NOTE
"Chief Complaint   Patient presents with     Abdominal Pain     IBD       Vitals:    06/13/18 1141   BP: 122/76   Pulse: 70   Temp: 97.5  F (36.4  C)   TempSrc: Oral   SpO2: 98%   Weight: 131 lb   Height: 5' 4\"       Body mass index is 22.49 kg/(m^2).      Mariama STRATTON LPN                        "

## 2019-11-08 ENCOUNTER — HEALTH MAINTENANCE LETTER (OUTPATIENT)
Age: 64
End: 2019-11-08

## 2020-02-23 ENCOUNTER — HEALTH MAINTENANCE LETTER (OUTPATIENT)
Age: 65
End: 2020-02-23

## 2020-12-06 ENCOUNTER — HEALTH MAINTENANCE LETTER (OUTPATIENT)
Age: 65
End: 2020-12-06

## 2021-04-11 ENCOUNTER — HEALTH MAINTENANCE LETTER (OUTPATIENT)
Age: 66
End: 2021-04-11

## 2021-05-28 ENCOUNTER — RECORDS - HEALTHEAST (OUTPATIENT)
Dept: ADMINISTRATIVE | Facility: CLINIC | Age: 66
End: 2021-05-28

## 2021-09-25 ENCOUNTER — HEALTH MAINTENANCE LETTER (OUTPATIENT)
Age: 66
End: 2021-09-25

## 2022-03-12 ENCOUNTER — HEALTH MAINTENANCE LETTER (OUTPATIENT)
Age: 67
End: 2022-03-12

## 2022-05-07 ENCOUNTER — HEALTH MAINTENANCE LETTER (OUTPATIENT)
Age: 67
End: 2022-05-07

## 2022-06-23 PROCEDURE — 88112 CYTOPATH CELL ENHANCE TECH: CPT | Mod: TC,ORL | Performed by: COLON & RECTAL SURGERY

## 2022-06-23 PROCEDURE — 88112 CYTOPATH CELL ENHANCE TECH: CPT | Mod: 26 | Performed by: PATHOLOGY

## 2022-06-24 ENCOUNTER — LAB REQUISITION (OUTPATIENT)
Dept: LAB | Facility: CLINIC | Age: 67
End: 2022-06-24
Payer: MEDICARE

## 2022-06-24 DIAGNOSIS — A63.0 ANOGENITAL (VENEREAL) WARTS: ICD-10-CM

## 2022-06-27 LAB
PATH REPORT.COMMENTS IMP SPEC: ABNORMAL
PATH REPORT.COMMENTS IMP SPEC: YES
PATH REPORT.FINAL DX SPEC: ABNORMAL
PATH REPORT.GROSS SPEC: ABNORMAL
PATH REPORT.MICROSCOPIC SPEC OTHER STN: ABNORMAL
PATH REPORT.RELEVANT HX SPEC: ABNORMAL

## 2022-08-05 ENCOUNTER — LAB REQUISITION (OUTPATIENT)
Dept: LAB | Facility: CLINIC | Age: 67
End: 2022-08-05
Payer: COMMERCIAL

## 2022-08-05 PROCEDURE — 88305 TISSUE EXAM BY PATHOLOGIST: CPT | Mod: TC,ORL | Performed by: COLON & RECTAL SURGERY

## 2022-08-09 LAB
PATH REPORT.COMMENTS IMP SPEC: NORMAL
PATH REPORT.FINAL DX SPEC: NORMAL
PATH REPORT.GROSS SPEC: NORMAL
PATH REPORT.MICROSCOPIC SPEC OTHER STN: NORMAL
PATH REPORT.MICROSCOPIC SPEC OTHER STN: NORMAL
PATH REPORT.RELEVANT HX SPEC: NORMAL
PHOTO IMAGE: NORMAL

## 2022-08-09 PROCEDURE — 88305 TISSUE EXAM BY PATHOLOGIST: CPT | Mod: 26 | Performed by: PATHOLOGY

## 2022-08-09 PROCEDURE — 88342 IMHCHEM/IMCYTCHM 1ST ANTB: CPT | Mod: 26 | Performed by: PATHOLOGY

## 2022-08-24 ENCOUNTER — APPOINTMENT (OUTPATIENT)
Dept: GENERAL RADIOLOGY | Facility: CLINIC | Age: 67
End: 2022-08-24
Attending: EMERGENCY MEDICINE
Payer: COMMERCIAL

## 2022-08-24 ENCOUNTER — HOSPITAL ENCOUNTER (EMERGENCY)
Facility: CLINIC | Age: 67
Discharge: HOME | End: 2022-08-24
Attending: EMERGENCY MEDICINE | Admitting: EMERGENCY MEDICINE
Payer: COMMERCIAL

## 2022-08-24 VITALS
DIASTOLIC BLOOD PRESSURE: 72 MMHG | OXYGEN SATURATION: 96 % | TEMPERATURE: 98.2 F | SYSTOLIC BLOOD PRESSURE: 117 MMHG | HEART RATE: 60 BPM | RESPIRATION RATE: 15 BRPM

## 2022-08-24 DIAGNOSIS — R07.9 CHEST PAIN, UNSPECIFIED TYPE: ICD-10-CM

## 2022-08-24 DIAGNOSIS — R91.1 PULMONARY NODULE: ICD-10-CM

## 2022-08-24 DIAGNOSIS — R06.00 DYSPNEA, UNSPECIFIED TYPE: ICD-10-CM

## 2022-08-24 LAB
ALBUMIN SERPL-MCNC: 3.8 G/DL (ref 3.4–5)
ALP SERPL-CCNC: 72 U/L (ref 40–150)
ALT SERPL W P-5'-P-CCNC: 23 U/L (ref 0–50)
ANION GAP SERPL CALCULATED.3IONS-SCNC: 7 MMOL/L (ref 3–14)
AST SERPL W P-5'-P-CCNC: 17 U/L (ref 0–45)
ATRIAL RATE - MUSE: 67 BPM
BASOPHILS # BLD AUTO: 0 10E3/UL (ref 0–0.2)
BASOPHILS NFR BLD AUTO: 1 %
BILIRUB SERPL-MCNC: 0.3 MG/DL (ref 0.2–1.3)
BUN SERPL-MCNC: 20 MG/DL (ref 7–30)
CALCIUM SERPL-MCNC: 9 MG/DL (ref 8.5–10.1)
CHLORIDE BLD-SCNC: 108 MMOL/L (ref 94–109)
CO2 SERPL-SCNC: 25 MMOL/L (ref 20–32)
CREAT SERPL-MCNC: 0.7 MG/DL (ref 0.52–1.04)
D DIMER PPP FEU-MCNC: 0.34 UG/ML FEU (ref 0–0.5)
DIASTOLIC BLOOD PRESSURE - MUSE: NORMAL MMHG
EOSINOPHIL # BLD AUTO: 0.1 10E3/UL (ref 0–0.7)
EOSINOPHIL NFR BLD AUTO: 1 %
ERYTHROCYTE [DISTWIDTH] IN BLOOD BY AUTOMATED COUNT: 12.8 % (ref 10–15)
GFR SERPL CREATININE-BSD FRML MDRD: >90 ML/MIN/1.73M2
GLUCOSE BLD-MCNC: 109 MG/DL (ref 70–99)
HCT VFR BLD AUTO: 40.2 % (ref 35–47)
HGB BLD-MCNC: 13.4 G/DL (ref 11.7–15.7)
HOLD SPECIMEN: NORMAL
IMM GRANULOCYTES # BLD: 0 10E3/UL
IMM GRANULOCYTES NFR BLD: 0 %
INTERPRETATION ECG - MUSE: NORMAL
LYMPHOCYTES # BLD AUTO: 2 10E3/UL (ref 0.8–5.3)
LYMPHOCYTES NFR BLD AUTO: 52 %
MCH RBC QN AUTO: 29.9 PG (ref 26.5–33)
MCHC RBC AUTO-ENTMCNC: 33.3 G/DL (ref 31.5–36.5)
MCV RBC AUTO: 90 FL (ref 78–100)
MONOCYTES # BLD AUTO: 0.4 10E3/UL (ref 0–1.3)
MONOCYTES NFR BLD AUTO: 9 %
NEUTROPHILS # BLD AUTO: 1.4 10E3/UL (ref 1.6–8.3)
NEUTROPHILS NFR BLD AUTO: 37 %
NRBC # BLD AUTO: 0 10E3/UL
NRBC BLD AUTO-RTO: 0 /100
P AXIS - MUSE: 57 DEGREES
PLATELET # BLD AUTO: 164 10E3/UL (ref 150–450)
POTASSIUM BLD-SCNC: 3.9 MMOL/L (ref 3.4–5.3)
PR INTERVAL - MUSE: 128 MS
PROT SERPL-MCNC: 7.4 G/DL (ref 6.8–8.8)
QRS DURATION - MUSE: 70 MS
QT - MUSE: 420 MS
QTC - MUSE: 443 MS
R AXIS - MUSE: 9 DEGREES
RBC # BLD AUTO: 4.48 10E6/UL (ref 3.8–5.2)
SODIUM SERPL-SCNC: 140 MMOL/L (ref 133–144)
SYSTOLIC BLOOD PRESSURE - MUSE: NORMAL MMHG
T AXIS - MUSE: 43 DEGREES
TROPONIN I SERPL HS-MCNC: 4 NG/L
TROPONIN I SERPL HS-MCNC: 5 NG/L
VENTRICULAR RATE- MUSE: 67 BPM
WBC # BLD AUTO: 3.9 10E3/UL (ref 4–11)

## 2022-08-24 PROCEDURE — 36415 COLL VENOUS BLD VENIPUNCTURE: CPT | Performed by: EMERGENCY MEDICINE

## 2022-08-24 PROCEDURE — 85379 FIBRIN DEGRADATION QUANT: CPT | Performed by: EMERGENCY MEDICINE

## 2022-08-24 PROCEDURE — 84484 ASSAY OF TROPONIN QUANT: CPT | Performed by: EMERGENCY MEDICINE

## 2022-08-24 PROCEDURE — 84484 ASSAY OF TROPONIN QUANT: CPT | Mod: 91 | Performed by: EMERGENCY MEDICINE

## 2022-08-24 PROCEDURE — 93005 ELECTROCARDIOGRAM TRACING: CPT

## 2022-08-24 PROCEDURE — 99285 EMERGENCY DEPT VISIT HI MDM: CPT | Mod: 25

## 2022-08-24 PROCEDURE — 82310 ASSAY OF CALCIUM: CPT | Performed by: EMERGENCY MEDICINE

## 2022-08-24 PROCEDURE — 71046 X-RAY EXAM CHEST 2 VIEWS: CPT

## 2022-08-24 PROCEDURE — 85004 AUTOMATED DIFF WBC COUNT: CPT | Performed by: EMERGENCY MEDICINE

## 2022-08-24 PROCEDURE — 85025 COMPLETE CBC W/AUTO DIFF WBC: CPT | Performed by: EMERGENCY MEDICINE

## 2022-08-24 ASSESSMENT — ACTIVITIES OF DAILY LIVING (ADL)
ADLS_ACUITY_SCORE: 35
ADLS_ACUITY_SCORE: 35

## 2022-08-24 ASSESSMENT — ENCOUNTER SYMPTOMS
NUMBNESS: 1
SHORTNESS OF BREATH: 1

## 2022-08-24 NOTE — ED TRIAGE NOTES
"Pt presents with \"heartburn pain\" in epigastric area with L arm pain and numbness starting at 0100. \"4/10\" \"achy and heavy\" pain worse in arm. Pt reports sob worse with exertion.     "

## 2022-08-24 NOTE — ED PROVIDER NOTES
"  History   Chief Complaint:  Chest Pain and Shortness of Breath       The history is provided by the patient.      Augustina Milligan is a 67 year old female with history of WALT, GERD, PVD, lung nodules, and hypotension who presents with shortness of breath since last night and pain and paraesthesia to the left arm.  She states she was out with her friends when she began to feel like she \"did not have enough breath or air.\"  She used her inhaler but without change in symptoms.  She went home and took Prilosec before bed as she suspected heart burn.  She woke up suddenly 2 hours ago with shortness of breath and new pain and paraesthesia to the left arm, prompting her arrival at the ED. She also notes feeling increased fatigue recently despite typically being active.  She does have history of arrhyhtmia and family history of angina, prompting concern of cardiac symptoms today.  She has been seen recently by an ENT doctor and a lung doctor for similar shortness of breath symptoms.  It was noted that she has lung scarring with history of pneumonia, and she has a surgery scheduled to remove nodules in her nose, which her ENT doctor suspects could be causing the shortness of breath. She denies chest pain.     Review of Systems   Respiratory: Positive for shortness of breath.    Cardiovascular: Negative for chest pain.   Neurological: Positive for numbness (and pain to left arm).   All other systems reviewed and are negative.    Allergies:  Lactose  Lorazepam  Morphine  Sulfa Drugs  Sulfasalazine   Zolpidem  Codeine  Diphenhydramine    Medications:  Aspirin  Dicyclomine   Loperamide   Valacyclovir   Naproxen   Estradiol    Past Medical History:     WALT  Lung nodules  PVD, bilateral  ETD, right  Chronic neck pain  GERD  Fibromyalgia   Nuclear sclerosis   Hypotension    Past Surgical History:    Lasik, bilateral  Spine procedure  Knee arthroscopy  Cholecystectomy  Tonsillectomy   Cataract removal x2, bilateral    Family " History:    Melanoma  Diabetes  Heart failure  Hyperlipidemia   Breast cancer  Stroke  Cataract   Lung disorder  Thyroid disorder    Social History:  The patient presents with her .  The patient presents in a private vehicle.  PCP: Estefania Lee MD    Physical Exam     Patient Vitals for the past 24 hrs:   BP Temp Temp src Pulse Resp SpO2   08/24/22 0430 117/72 -- -- 60 -- 96 %   08/24/22 0300 (!) 149/79 -- -- 65 15 --   08/24/22 0209 (!) 159/83 98.2  F (36.8  C) Oral 76 16 98 %       Physical Exam    General: Alert and Interactive.   Head: No signs of trauma.   Mouth/Throat: Oropharynx is clear and moist.   Eyes: Conjunctivae are normal. Pupils are equal, round, and reactive to light.   Neck: Normal range of motion. No nuchal rigidity.   CV: Normal rate and regular rhythm.    Resp: Effort normal and breath sounds normal. No respiratory distress.   GI: Soft. There is no tenderness or guarding.   MSK: Normal range of motion. no edema.   Neuro: The patient is alert and oriented to person, place, and time.  PERRLA, EOMI, strength in upper/lower extremities normal and symmetrical.   Sensation normal. Speech normal.  GCS eye subscore is 4. GCS verbal subscore is 5. GCS motor subscore is 6.   Skin: Skin is warm and dry. No rash noted.   Psych: normal mood and affect. behavior is normal.     Emergency Department Course   ECG  ECG taken at 0214, ECG read at 0257  Normal sinus rhythm.  Normal ECG.  Rate 67 bpm. UT interval 128 ms. QRS duration 70 ms. QT/QTc 420/443 ms. P-R-T axis 57 9 43.     Imaging:  XR Chest 2 Views   Final Result   IMPRESSION: 9 mm nodule in the left upper lobe projecting between the left posterior sixth and seventh ribs appears to be new from previous and warrants further evaluation by chest CT. Mild scarring at the lung apices. No pleural fluid or pneumothorax.    Normal heart size and pulmonary vascularity.        Report per radiology    Laboratory:  Labs Ordered and Resulted from Time of ED  Arrival to Time of ED Departure   COMPREHENSIVE METABOLIC PANEL - Abnormal       Result Value    Sodium 140      Potassium 3.9      Chloride 108      Carbon Dioxide (CO2) 25      Anion Gap 7      Urea Nitrogen 20      Creatinine 0.70      Calcium 9.0      Glucose 109 (*)     Alkaline Phosphatase 72      AST 17      ALT 23      Protein Total 7.4      Albumin 3.8      Bilirubin Total 0.3      GFR Estimate >90     CBC WITH PLATELETS AND DIFFERENTIAL - Abnormal    WBC Count 3.9 (*)     RBC Count 4.48      Hemoglobin 13.4      Hematocrit 40.2      MCV 90      MCH 29.9      MCHC 33.3      RDW 12.8      Platelet Count 164      % Neutrophils 37      % Lymphocytes 52      % Monocytes 9      % Eosinophils 1      % Basophils 1      % Immature Granulocytes 0      NRBCs per 100 WBC 0      Absolute Neutrophils 1.4 (*)     Absolute Lymphocytes 2.0      Absolute Monocytes 0.4      Absolute Eosinophils 0.1      Absolute Basophils 0.0      Absolute Immature Granulocytes 0.0      Absolute NRBCs 0.0     TROPONIN I - Normal    Troponin I High Sensitivity 5     D DIMER QUANTITATIVE - Normal    D-Dimer Quantitative 0.34     TROPONIN I - Normal    Troponin I High Sensitivity 4        Emergency Department Course:    Reviewed:  I reviewed nursing notes, vitals, past medical history and Care Everywhere    Assessments:  0257 I obtained history and examined the patient as noted above.   0529 I rechecked the patient and explained findings. I believe that they are safe for discharge at this time.    Disposition:  The patient was discharged to home.     Impression & Plan     Medical Decision Making:  The differential considered for the left arm and shoulder pain with dyspnea included CHF, PE, ACS, pneumonia, pneumothorax, medication induced pulmonary toxicity, ARDS, metabolic acidosis, airflow obstruction (asthma, COPD, upper airway obstruction), restrictive lung disease (interstitial lung disease, pleural thickening or effusion, respiratory  muscle weakness, obesity), aspiration, cardiac arrhythmia, cardiac tamponade, hypercapnia, sepsis, and anemia.  The etiology of these symptoms is unclear.  The patient is not hypoxic nor tachypneic.  The patient is not working hard to breathe.  The workup in the ED is negative.  The patient will continue the workup as an outpatient or return if symptoms worsen.  She is aware of her pulmonary nodule seen on x-ray and will follow up for further evaluation.      Diagnosis:    ICD-10-CM    1. Chest pain, unspecified type  R07.9    2. Dyspnea, unspecified type  R06.00    3. Pulmonary nodule  R91.1        Discharge Medications:  Discharge Medication List as of 8/24/2022  5:31 AM          Scribe Disclosure:  Kerrie MONCADA, am serving as a scribe at 3:03 AM on 8/24/2022 to document services personally performed by Zaheer Hardy MD based on my observations and the provider's statements to me.          Zaheer Hardy MD  08/24/22 2538     stretcher stretcher stretcher

## 2023-01-07 ENCOUNTER — HEALTH MAINTENANCE LETTER (OUTPATIENT)
Age: 68
End: 2023-01-07

## 2023-06-02 ENCOUNTER — HEALTH MAINTENANCE LETTER (OUTPATIENT)
Age: 68
End: 2023-06-02

## 2024-04-20 ENCOUNTER — HEALTH MAINTENANCE LETTER (OUTPATIENT)
Age: 69
End: 2024-04-20

## 2024-06-29 ENCOUNTER — HEALTH MAINTENANCE LETTER (OUTPATIENT)
Age: 69
End: 2024-06-29

## 2025-07-13 ENCOUNTER — HEALTH MAINTENANCE LETTER (OUTPATIENT)
Age: 70
End: 2025-07-13